# Patient Record
Sex: FEMALE | Race: OTHER | NOT HISPANIC OR LATINO | URBAN - METROPOLITAN AREA
[De-identification: names, ages, dates, MRNs, and addresses within clinical notes are randomized per-mention and may not be internally consistent; named-entity substitution may affect disease eponyms.]

---
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
## 2020-07-24 NOTE — DISCHARGE NOTE OB - MEDICATION SUMMARY - MEDICATIONS TO STOP TAKING
Patient was lifting heavy box on Tuesday and turning at same time to dump it into garbage when felt a sudden pop in right medial knee.  Swelling noted.  Difficult to straighten right knee.  CMS good.  Small bruised noted to medial side of knee.  No calf pain.  No other complaints.  Difficult to ambulate    I will STOP taking the medications listed below when I get home from the hospital:  None

## 2021-12-20 NOTE — DISCHARGE NOTE OB - CARE PROVIDER_API CALL
wife/Benefits, risks, and possible complications of procedure explained to patient/caregiver who verbalized understanding and gave written consent. [facility],   [address] [place], [place]  Phone: [phone]  Fax: (   )    -

## 2022-01-12 NOTE — DISCHARGE NOTE OB - ADDITIONAL INSTRUCTIONS
Continue breastfeeding.  Motrin as needed for pain.  Nothing per vagina x 6 weeks - no sex, tampons, douching, tub baths, etc.  Follow up in office in 5-6 weeks for check up
2
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
## 2024-01-22 ENCOUNTER — EMERGENCY (EMERGENCY)
Facility: HOSPITAL | Age: 24
LOS: 1 days | Discharge: HOME | End: 2024-01-22
Attending: EMERGENCY MEDICINE
Payer: COMMERCIAL

## 2024-01-22 VITALS
OXYGEN SATURATION: 99 % | RESPIRATION RATE: 18 BRPM | TEMPERATURE: 98 F | SYSTOLIC BLOOD PRESSURE: 110 MMHG | DIASTOLIC BLOOD PRESSURE: 68 MMHG | HEART RATE: 69 BPM

## 2024-01-22 VITALS
HEIGHT: 62.2 IN | DIASTOLIC BLOOD PRESSURE: 63 MMHG | OXYGEN SATURATION: 99 % | HEART RATE: 71 BPM | WEIGHT: 160.94 LBS | TEMPERATURE: 98 F | SYSTOLIC BLOOD PRESSURE: 122 MMHG | RESPIRATION RATE: 16 BRPM

## 2024-01-22 DIAGNOSIS — O07.4 FAILED ATTEMPTED TERMINATION OF PREGNANCY WITHOUT COMPLICATION: ICD-10-CM

## 2024-01-22 LAB
ALBUMIN SERPL ELPH-MCNC: 3.7 G/DL — SIGNIFICANT CHANGE UP (ref 3.5–5)
ALP SERPL-CCNC: 66 U/L — SIGNIFICANT CHANGE UP (ref 40–120)
ALT FLD-CCNC: 59 U/L DA — SIGNIFICANT CHANGE UP (ref 10–60)
ANION GAP SERPL CALC-SCNC: 5 MMOL/L — SIGNIFICANT CHANGE UP (ref 5–17)
APPEARANCE UR: ABNORMAL
AST SERPL-CCNC: 29 U/L — SIGNIFICANT CHANGE UP (ref 10–40)
BACTERIA # UR AUTO: ABNORMAL /HPF
BASOPHILS # BLD AUTO: 0.03 K/UL — SIGNIFICANT CHANGE UP (ref 0–0.2)
BASOPHILS NFR BLD AUTO: 0.3 % — SIGNIFICANT CHANGE UP (ref 0–2)
BILIRUB SERPL-MCNC: 0.8 MG/DL — SIGNIFICANT CHANGE UP (ref 0.2–1.2)
BILIRUB UR-MCNC: ABNORMAL
BUN SERPL-MCNC: 4 MG/DL — LOW (ref 7–18)
CALCIUM SERPL-MCNC: 10 MG/DL — SIGNIFICANT CHANGE UP (ref 8.4–10.5)
CHLORIDE SERPL-SCNC: 103 MMOL/L — SIGNIFICANT CHANGE UP (ref 96–108)
CO2 SERPL-SCNC: 26 MMOL/L — SIGNIFICANT CHANGE UP (ref 22–31)
COLOR SPEC: SIGNIFICANT CHANGE UP
COMMENT - URINE: SIGNIFICANT CHANGE UP
CREAT SERPL-MCNC: 0.55 MG/DL — SIGNIFICANT CHANGE UP (ref 0.5–1.3)
DIFF PNL FLD: ABNORMAL
EGFR: 130 ML/MIN/1.73M2 — SIGNIFICANT CHANGE UP
EOSINOPHIL # BLD AUTO: 0.08 K/UL — SIGNIFICANT CHANGE UP (ref 0–0.5)
EOSINOPHIL NFR BLD AUTO: 0.7 % — SIGNIFICANT CHANGE UP (ref 0–6)
EPI CELLS # UR: PRESENT
GLUCOSE SERPL-MCNC: 94 MG/DL — SIGNIFICANT CHANGE UP (ref 70–99)
GLUCOSE UR QL: NEGATIVE MG/DL — SIGNIFICANT CHANGE UP
HCG SERPL-ACNC: HIGH MIU/ML
HCT VFR BLD CALC: 41.1 % — SIGNIFICANT CHANGE UP (ref 34.5–45)
HGB BLD-MCNC: 13.7 G/DL — SIGNIFICANT CHANGE UP (ref 11.5–15.5)
IMM GRANULOCYTES NFR BLD AUTO: 0.3 % — SIGNIFICANT CHANGE UP (ref 0–0.9)
KETONES UR-MCNC: >=160 MG/DL
LEUKOCYTE ESTERASE UR-ACNC: ABNORMAL
LYMPHOCYTES # BLD AUTO: 1.95 K/UL — SIGNIFICANT CHANGE UP (ref 1–3.3)
LYMPHOCYTES # BLD AUTO: 17.8 % — SIGNIFICANT CHANGE UP (ref 13–44)
MCHC RBC-ENTMCNC: 31.3 PG — SIGNIFICANT CHANGE UP (ref 27–34)
MCHC RBC-ENTMCNC: 33.3 GM/DL — SIGNIFICANT CHANGE UP (ref 32–36)
MCV RBC AUTO: 93.8 FL — SIGNIFICANT CHANGE UP (ref 80–100)
MONOCYTES # BLD AUTO: 1.06 K/UL — HIGH (ref 0–0.9)
MONOCYTES NFR BLD AUTO: 9.7 % — SIGNIFICANT CHANGE UP (ref 2–14)
NEUTROPHILS # BLD AUTO: 7.8 K/UL — HIGH (ref 1.8–7.4)
NEUTROPHILS NFR BLD AUTO: 71.2 % — SIGNIFICANT CHANGE UP (ref 43–77)
NITRITE UR-MCNC: NEGATIVE — SIGNIFICANT CHANGE UP
NRBC # BLD: 0 /100 WBCS — SIGNIFICANT CHANGE UP (ref 0–0)
PH UR: 6.5 — SIGNIFICANT CHANGE UP (ref 5–8)
PLATELET # BLD AUTO: 265 K/UL — SIGNIFICANT CHANGE UP (ref 150–400)
POTASSIUM SERPL-MCNC: 3.5 MMOL/L — SIGNIFICANT CHANGE UP (ref 3.5–5.3)
POTASSIUM SERPL-SCNC: 3.5 MMOL/L — SIGNIFICANT CHANGE UP (ref 3.5–5.3)
PROT SERPL-MCNC: 7.4 G/DL — SIGNIFICANT CHANGE UP (ref 6–8.3)
PROT UR-MCNC: ABNORMAL MG/DL
RBC # BLD: 4.38 M/UL — SIGNIFICANT CHANGE UP (ref 3.8–5.2)
RBC # FLD: 12.9 % — SIGNIFICANT CHANGE UP (ref 10.3–14.5)
RBC CASTS # UR COMP ASSIST: 2 /HPF — SIGNIFICANT CHANGE UP (ref 0–4)
SODIUM SERPL-SCNC: 134 MMOL/L — LOW (ref 135–145)
SP GR SPEC: 1.02 — SIGNIFICANT CHANGE UP (ref 1–1.03)
UROBILINOGEN FLD QL: 1 MG/DL — SIGNIFICANT CHANGE UP (ref 0.2–1)
WBC # BLD: 10.95 K/UL — HIGH (ref 3.8–10.5)
WBC # FLD AUTO: 10.95 K/UL — HIGH (ref 3.8–10.5)
WBC UR QL: 2 /HPF — SIGNIFICANT CHANGE UP (ref 0–5)

## 2024-01-22 PROCEDURE — 99285 EMERGENCY DEPT VISIT HI MDM: CPT

## 2024-01-22 PROCEDURE — 99284 EMERGENCY DEPT VISIT MOD MDM: CPT | Mod: 25

## 2024-01-22 PROCEDURE — 80053 COMPREHEN METABOLIC PANEL: CPT

## 2024-01-22 PROCEDURE — 76801 OB US < 14 WKS SINGLE FETUS: CPT | Mod: 26

## 2024-01-22 PROCEDURE — 96361 HYDRATE IV INFUSION ADD-ON: CPT

## 2024-01-22 PROCEDURE — 76801 OB US < 14 WKS SINGLE FETUS: CPT

## 2024-01-22 PROCEDURE — 96375 TX/PRO/DX INJ NEW DRUG ADDON: CPT

## 2024-01-22 PROCEDURE — 76817 TRANSVAGINAL US OBSTETRIC: CPT | Mod: 26

## 2024-01-22 PROCEDURE — 85025 COMPLETE CBC W/AUTO DIFF WBC: CPT

## 2024-01-22 PROCEDURE — 81001 URINALYSIS AUTO W/SCOPE: CPT

## 2024-01-22 PROCEDURE — 84702 CHORIONIC GONADOTROPIN TEST: CPT

## 2024-01-22 PROCEDURE — 76817 TRANSVAGINAL US OBSTETRIC: CPT

## 2024-01-22 PROCEDURE — 36415 COLL VENOUS BLD VENIPUNCTURE: CPT

## 2024-01-22 PROCEDURE — 96374 THER/PROPH/DIAG INJ IV PUSH: CPT

## 2024-01-22 RX ORDER — ONDANSETRON 8 MG/1
1 TABLET, FILM COATED ORAL
Qty: 9 | Refills: 0
Start: 2024-01-22 | End: 2024-01-24

## 2024-01-22 RX ORDER — SODIUM CHLORIDE 9 MG/ML
1000 INJECTION INTRAMUSCULAR; INTRAVENOUS; SUBCUTANEOUS ONCE
Refills: 0 | Status: COMPLETED | OUTPATIENT
Start: 2024-01-22 | End: 2024-01-22

## 2024-01-22 RX ORDER — ONDANSETRON 8 MG/1
4 TABLET, FILM COATED ORAL ONCE
Refills: 0 | Status: COMPLETED | OUTPATIENT
Start: 2024-01-22 | End: 2024-01-22

## 2024-01-22 RX ORDER — ACETAMINOPHEN 500 MG
2 TABLET ORAL
Qty: 24 | Refills: 0
Start: 2024-01-22 | End: 2024-01-24

## 2024-01-22 RX ORDER — ACETAMINOPHEN 500 MG
1000 TABLET ORAL ONCE
Refills: 0 | Status: COMPLETED | OUTPATIENT
Start: 2024-01-22 | End: 2024-01-22

## 2024-01-22 RX ADMIN — SODIUM CHLORIDE 1000 MILLILITER(S): 9 INJECTION INTRAMUSCULAR; INTRAVENOUS; SUBCUTANEOUS at 17:11

## 2024-01-22 RX ADMIN — SODIUM CHLORIDE 1000 MILLILITER(S): 9 INJECTION INTRAMUSCULAR; INTRAVENOUS; SUBCUTANEOUS at 15:59

## 2024-01-22 RX ADMIN — Medication 400 MILLIGRAM(S): at 14:59

## 2024-01-22 RX ADMIN — SODIUM CHLORIDE 1000 MILLILITER(S): 9 INJECTION INTRAMUSCULAR; INTRAVENOUS; SUBCUTANEOUS at 16:11

## 2024-01-22 RX ADMIN — Medication 1000 MILLIGRAM(S): at 15:14

## 2024-01-22 RX ADMIN — Medication 1000 MILLIGRAM(S): at 15:29

## 2024-01-22 RX ADMIN — ONDANSETRON 4 MILLIGRAM(S): 8 TABLET, FILM COATED ORAL at 14:59

## 2024-01-22 RX ADMIN — SODIUM CHLORIDE 1000 MILLILITER(S): 9 INJECTION INTRAMUSCULAR; INTRAVENOUS; SUBCUTANEOUS at 14:59

## 2024-01-22 NOTE — ED ADULT NURSE NOTE - NSFALLUNIVINTERV_ED_ALL_ED
Bed/Stretcher in lowest position, wheels locked, appropriate side rails in place/Call bell, personal items and telephone in reach/Instruct patient to call for assistance before getting out of bed/chair/stretcher/Non-slip footwear applied when patient is off stretcher/Ellis to call system/Physically safe environment - no spills, clutter or unnecessary equipment/Purposeful proactive rounding/Room/bathroom lighting operational, light cord in reach

## 2024-01-22 NOTE — ED PROVIDER NOTE - CLINICAL SUMMARY MEDICAL DECISION MAKING FREE TEXT BOX
24-year-old female, presents for evaluation of abdominal pain, nausea/vomiting and vaginal bleeding after taking  pills on  and .  Appears uncomfortable, no signs of distress.  Vital signs within normal limits, afebrile.  Plan for ultrasound to assess for retained POC, labs, urine, meds and reassess.  At this time low suspicion for endometritis. 24-year-old female, presents for evaluation of abdominal pain, nausea/vomiting and vaginal bleeding after taking  pills on  and .  Appears uncomfortable, no signs of distress.  Vital signs within normal limits, afebrile.  Plan for ultrasound to assess for retained POC, labs, urine, meds and reassess.  At this time low suspicion for endometritis.    18: 33–patient well-appearing, feeling much better, abdomen soft, nondistended and nontender.  Discussed with OB/GYN.  At this time diagnosis is failed attempted .  Therefore unable to assist with  with additional medication in the ER.  [facility] .  Discussed red flags to come back to the hospital.  Will provide all results and information for outpatient follow-up.

## 2024-01-22 NOTE — ED PROVIDER NOTE - OBJECTIVE STATEMENT
24-year-old female, history of ectopic pregnancy, presents with evaluation of generalized abdominal pain most severe to LLQ, nausea/vomiting and vaginal bleeding after medical .  Was given medication by mouth on  and for additional pills to put on the cheek the next day.  Patient feels like she vomited the second dose that she took at home.  Since then reports generalized cramping pain with vaginal bleeding.  Feels like it is menstrual bleeding and changes 3–4 pads per day.  On the first day had temperature max 100.6 F but in the last 2 days denies any fever at all.  Has difficulty tolerating p.o. intake. LMP 23.

## 2024-01-22 NOTE — ED PROVIDER NOTE - PATIENT PORTAL LINK FT
You can access the FollowMyHealth Patient Portal offered by [facility] by registering at the following website: [email or web address]. By joining [employer]’s FollowMyHealth portal, you will also be able to view your health information using other applications (apps) compatible with our system.

## 2024-01-22 NOTE — ED PROVIDER NOTE - ATTENDING APP SHARED VISIT CONTRIBUTION OF CARE
I agree with the NP findings except as noted in my attestation note.    24 female with hx of ectopic pregnancy s/p medical  .   Pt presenting to the ED reporting lower abdominal pain & vaginal spotting.    Gastrointestinal: (+) abdominal pain (-) diarrhea (-) vomiting  Genitourinary: See HPI    Gen:  Awake, alert, NAD, WDWN, NCAT, non-toxic appearing.   Eyes:  PERRL, EOMI, no icterus, normal lids/lashes, normal conjunctivae.  ENT:  External inspection normal, pink/moist membranes.    CV:  S1S2, regular rate and rhythm, no murmur/gallops/rubs, no JVD, 2+ pulses b/l, no edema/cords/homans, warm/well-perfused.  Resp:  Normal respiratory rate/effort, no respiratory distress, normal voice, speaking full sentences, lungs clear to auscultation b/l, no wheezing/rales/rhonchi, no retractions, no stridor.  Abd:  Soft abdomen, no tender/distended/guarding/rebound, no pulsatile mass, no CVA tender.  Musculoskeletal:  N/V intact, FROM all 4 extremities, normal motor tone, stable gait.   Neck:  FROM neck, supple, trachea midline, no meningismus.   Skin:  Color normal for race, warm and dry, no rash.  Neuro:  Oriented x3, CN 2-12 intact (grossly), normal motor (grossly), normal sensory (grossly),  normal gait.  GCS 15  Psych:  Attention normal. Affect normal. Behavior normal. Judgment normal.     24 female with hx of ectopic pregnancy s/p medical  .   Pt presenting to the ED reporting lower abdominal pain & vaginal spotting.  Vitals stable.  Nontoxic appearing, n/v intact.  Airway intact, no respiratory distress, no hypoxia.  No abdominal or CVA tenderness.    Prior type A+ in EMR from 2018    Plan to obtain:    -Labs, ultrasound R/O retained POC, analgesia as needed, GYN consult, observe/reassess    Lab values demonstrate no acute/emergent pathology.  hCG ~40 K  Ultrasound showing no definitive IUP  Care discussed with GYN ADALGISA Galicia, no indication for operative intervention at this time.  No indication for admission at this time.  Recommending close outpatient follow-up with GYN    Pt advised regarding need for close outpatient follow up.  Patient stable for further care in outpatient setting. No indication for inpatient admission at this time. Patient advised regarding symptomatic & supportive care and symptoms to prompt ED return. Strict return precautions provided.

## 2024-01-22 NOTE — ED PROVIDER NOTE - RESPIRATORY, MLM
Breath sounds clear and equal bilaterally.
Goal: Patient will be able to complete all bed mobility independently in 3 weeks.

## 2024-01-22 NOTE — ED ADULT NURSE NOTE - OBJECTIVE STATEMENT
Patient presented to the ED complaining of foul smelling vaginal bleeding and pain to the left side of the abdomen after taking  pills last wednesday.

## 2024-01-22 NOTE — ED PROVIDER NOTE - NSFOLLOWUPINSTRUCTIONS_ED_ALL_ED_FT
Follow up with the [facility] clinic within 2-3 days.  If you experience any new or worsening symptoms or if you are concerned you can always come back to the emergency for a re-evaluation.  Some results may not be available at the time of your discharge from the hospital. You can download the FOLLOW MY HEALTH susana and have access to these results.  If there were any prescriptions given to you during the visit today take them as prescribed. If you have any questions you can ask the pharmacist.

## 2024-01-22 NOTE — CONSULT NOTE ADULT - SUBJECTIVE AND OBJECTIVE BOX
24-yo female  , history of ectopic pregnancy, presents with evaluation of generalized abdominal pain most severe to LLQ, nausea/vomiting and vaginal bleeding after medical .  Was given medication by mouth on  and for additional pills to put on the cheek the next day.  Patient feels like she vomited the second dose that she took at home.  Since then reports generalized cramping pain with vaginal bleeding.  Feels like it is menstrual bleeding and changes 3–4 pads per day.  On the first day had temperature max 100.6 F but in the last 2 days denies any fever at all.  Has difficulty tolerating p.o. intake. LMP 23.    PMH: cholelithiasis, ovarian cysts  PSH: cholecystectomy , ectopic with salpingectomy (unknown side) D&C  Pobhx:   uncomplicated, ETOP with D&C, SABx1; ectopic  with salpingectomy  Pgyn: h/o chlamydia at 21yo treated; right ovarian cyst; denies fibroids or abnormal pa smears  Meds: motrin as needed  Allergies: NKDA  Social: denies smoking, etoh or drug use; +h/o anxiety/depression seeing therapist    PE: Pt seated comfortably in no distress  Abd: soft, NT; no guarding or rebound  Ext: soft, NT; no edema  Back: no CVAT  Gyn: deferred by patient    Labs:                         13.7   10.95 )-----------( 265      ( 2024 14:40 )             41.1         134<L>  |  103  |  4<L>  ----------------------------<  94  3.5   |  26  |  0.55    Ca    10.0      2024 14:40    TPro  7.4  /  Alb  3.7  /  TBili  0.8  /  DBili  x   /  AST  29  /  ALT  59  /  AlkPhos  66      HCG Quantitative, Serum: 02795    US Transvaginal, OB (24 @ 15:40) >  EXAM:  US OB TRANSVAGINAL   ORDERED BY:  SHANNON MCGOVERN   ACC: [id number] EXAM:  US OB LES THAN 14 WKS 1ST GEST   ORDERED BY:  SHANNON MCGOVERN   PROCEDURE DATE:  2024    INTERPRETATION:  CLINICAL INFORMATION: Vaginal bleeding. Status post   medical  2024. Evaluate for retained products of conception.  LMP: 2023  Estimated Gestational Age by LMP: 7 weeks 2 days  COMPARISON: None available.  Transabdominal pelvic sonogram only.  FINDINGS:  Uterus: 9.9 x 5.1 x 5.9 cm. Intrauterine gestational sac is identified.   Low level echoes within the gestational sac. No fetal pole or yolk sac is   seen.  Gestational Sac Size (mean): 16.2 mm  Gestational Sac Shape : Abnormal.  Right ovary: 2.3cm x 1.3 cm x 1.6 cm. Within normal limits. Normal   arterial and venous waveforms.  Left ovary: 6.0 cm x 4.0 cm x 4.7 cm. Simple cyst measuring 4.6 x 2.8 x   3.4 cm. Corpus luteum. Normal arterial and venous waveforms.  Fluid: None.  IMPRESSION:  Intrauterine pregnancy of uncertain viability. No yolk sac or fetal pole   seen. Serial hCG and ultrasound are recommended to determine the   significance of these findings.    d/w Dr. Olguin

## 2024-01-22 NOTE — CONSULT NOTE ADULT - PROBLEM SELECTOR RECOMMENDATION 9
Pt instructed to return to [facility] tomorrow for follow up and to discuss further options for desired termination of pregnancy.  Encouraged oral hydration, light meals as tolerated, tylenol/motrin as needed for pain or fever,   Return to ED if worsening symptoms including pain, fever, chills, vomiting, worsening vaginal bleeding or any other acute issues

## 2024-11-02 ENCOUNTER — EMERGENCY (EMERGENCY)
Facility: HOSPITAL | Age: 24
LOS: 1 days | Discharge: HOME | End: 2024-11-02
Admitting: EMERGENCY MEDICINE

## 2024-11-02 VITALS
SYSTOLIC BLOOD PRESSURE: 126 MMHG | RESPIRATION RATE: 16 BRPM | HEART RATE: 99 BPM | TEMPERATURE: 98 F | HEIGHT: 60 IN | DIASTOLIC BLOOD PRESSURE: 80 MMHG | OXYGEN SATURATION: 100 % | WEIGHT: 166.01 LBS

## 2024-11-02 PROCEDURE — 99283 EMERGENCY DEPT VISIT LOW MDM: CPT

## 2024-11-02 RX ORDER — ACETAMINOPHEN 500 MG
650 TABLET ORAL ONCE
Refills: 0 | Status: COMPLETED | OUTPATIENT
Start: 2024-11-02 | End: 2024-11-02

## 2024-11-02 RX ADMIN — Medication 650 MILLIGRAM(S): at 22:20
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
## 2024-11-02 NOTE — ED PROVIDER NOTE - NSFOLLOWUPINSTRUCTIONS_ED_ALL_ED_FT
Follow up with your PMD within 24-48 hrs   Rest, Take Tylenol 650mg every 4-6 hours as needed for pain.  Return to the emergency department with ANY worsening or new concerning symptoms.     Head Injury, Adult:    There are many types of head injuries. Head injuries can be as minor as a bump, or they can be more severe. More severe head injuries include:  A jarring injury to the brain (concussion).  A bruise of the brain (contusion). This means there is bleeding in the brain that can cause swelling.  A cracked skull (skull fracture).  Bleeding in the brain that collects, clots, and forms a bump (hematoma).  After a head injury, you may need to be observed for a while in the emergency department or urgent care. Sometimes admission to the hospital is needed.    After a head injury has happened, most problems occur within the first 24 hours, but side effects may occur up to 7–10 days after the injury. It is important to watch your condition for any changes.    What are the causes?  There are many possible causes of a head injury. A serious head injury may happen to someone who is in a car accident (motor vehicle collision). Other causes of major head injuries include bicycle or motorcycle accidents, sports injuries, and falls.    Risk factors  This condition is more likely to occur in people who:  Drink a lot of alcohol or use drugs.  Are over the age of 65.  Are at risk for falls.  What are the symptoms?  There are many possible symptoms of a head injury. Visible symptoms of a head injury include a bruise, bump, or bleeding at the site of the injury. Other non-visible symptoms include:  Feeling sleepy or not being able to stay awake.  Passing out.  Headache.  Seizures.  Dizziness.  Confusion.  Memory problems.  Nausea or vomiting.  Other possible symptoms that may develop after the head injury include:  Poor attention and concentration.  Fatigue or tiring easily.  Irritability.  Being uncomfortable around bright lights or loud noises.  Anxiety or depression.  Disturbed sleep.    How is this treated?  Treatment for this condition depends on the severity and type of injury you have. The main goal of treatment is to prevent complications and allow the brain time to heal.    For mild head injury, you may be sent home and treatment may include:  Observation. A responsible adult should stay with you for 24 hours after your injury and check on you often.  Physical rest.  Brain rest.  Pain medicines.    Follow these instructions at home:    Rest as much as possible and avoid activities that are physically hard or tiring.  Make sure you get enough sleep.  Limit activities that require a lot of thought or attention, such as:  Watching TV.  Playing memory games and puzzles.  Job-related work or homework.  Working on the computer, social media, and texting.  Avoid activities that could cause another head injury, such as playing sports, until your health care provider approves. Having another head injury, especially before the first one has healed, can be dangerous.  Ask your health care provider when it is safe for you to return to your regular activities, including work or school. Ask your health care provider for a step-by-step plan for gradually returning to activities.  Ask your health care provider when you can drive, ride a bicycle, or use heavy machinery. Your ability to react may be slower after a brain injury. Never do these activities if you are dizzy.  Lifestyle     Do not drink alcohol until your health care provider approves, and avoid drug use. Alcohol and certain drugs may slow your recovery and can put you at risk of further injury.  If it is harder than usual to remember things, write them down.  If you are easily distracted, try to do one thing at a time.  Talk with family members or close friends when making important decisions.  Tell your friends, family, a trusted colleague, and  about your injury, symptoms, and restrictions. Have them watch for any new or worsening problems.  General instructions     Take over-the-counter and prescription medicines only as told by your health care provider.  Have someone stay with you for 24 hours after your head injury. This person should watch you for any changes in your symptoms and be ready to seek medical help, as needed.  Keep all follow-up visits as told by your health care provider. This is important.  How is this prevented?  Work on improving your balance and strength to avoid falls.  Wear a seatbelt when you are in a moving vehicle.  Wear a helmet when riding a bicycle, skiing, or doing any other sport or activity that has a risk of injury.  Drink alcohol only in moderation.  Take safety measures in your home, such as:  Removing clutter and tripping hazards from floors and stairways.  Using grab bars in bathrooms and handrails by stairs.  Placing non-slip mats on floors and in bathtubs.  Improving lighting in dim areas.  Get help right away if:  You have:  A severe headache that is not helped by medicine.  Trouble walking, have weakness in your arms and legs, or lose your balance.  Clear or bloody fluid coming from your nose or ears.  Changes in your vision.  A seizure.  You vomit.  Your symptoms get worse.  Your speech is slurred.  You pass out.  You are sleepier and have trouble staying awake.  Your pupils change size.  These symptoms may represent a serious problem that is an emergency. Do not wait to see if the symptoms will go away. Get medical help right away. Call your local emergency services (911 in the [place] Do not drive yourself to the hospital.     This information is not intended to replace advice given to you by your health care provider. Make sure you discuss any questions you have with your health care provider.

## 2024-11-02 NOTE — ED ADULT TRIAGE NOTE - RESPIRATORY RATE (BREATHS/MIN)
HPI: Josiane Temple is a 31 year old female being seen at [facility] today for an accelerated heart rate that she has had for over a year, with increased symptoms of anxiousness, Headache, for the past few days.  Heartrate up to 140, /88.  No history of thyroid disorder/presyncopal/syncopal episode.  No CP/SOB.   2, para 2, AB 0.  No pre-eclampsia.  PMH is negative.    Social Hx;Smoke - no                     ETOH - no  Family HX;No CAD or Cancer  ROS negative except as noted above.    Exam:  Visit Vitals  /74 (BP Location: [facility] [place] Patient Position: Sitting, Cuff Size: Regular)   Pulse 117   Temp 97.8 °F (36.6 °C) (Oral)   Ht 5' 6\" (1.676 m)   Wt 73.9 kg   LMP 2017 (Approximate)   SpO2 97%   BMI 26.31 kg/m²      General: cooperative, no acute distress  Eyes: anicteric  Mouth: Mucous membranes moist, oral cavity without lesions.  Neck: supple. No bruits. Thyroid small without nodules.  Cardiology: RRR. No murmur. Normal S1 S2.  EKG ST at 110.    Pulmonary: Clear.  Abdomen: nontender, nondistended, normoactive bowel sounds.  No hepatosplenomegaly.  No rebound tenderness.  Lymphadenopathy: No lymphadenopathy.  Extremities: no clubbing, cyanosis, or edema.  Gait normal.  Skin: no rashes, lesions.  Psychiatric:  Cooperative, Appropriate mood & affect.   Data:   Labs reviewed - pending.    Assessment / Plan:  Arrhythmia - No caffeine / ETOH.  Labs pending. Follow-up Cardiology after lab review if needed.  Headaches - Excedrin Migraine / Midrin PRN.    *!!Please call patient with Polish  when communicating lab results.      Encouraged healthy lifestyle with regular exercise and healthy diet.  Return to clinic to see cardiologist.    Second part of visit with Polish  [id number] from [email or web address].    Scribed by: Miriam Medina, .    
16
<-------- Click here to INCLUDE CoVID-19 Discharge Instructions
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
## 2024-11-02 NOTE — ED PROVIDER NOTE - PATIENT PORTAL LINK FT
You can access the FollowMyHealth Patient Portal offered by [facility] by registering at the following website: [email or web address]. By joining [employer]’s FollowMyHealth portal, you will also be able to view your health information using other applications (apps) compatible with our system.

## 2024-11-03 PROBLEM — O00.90 UNSPECIFIED ECTOPIC PREGNANCY WITHOUT INTRAUTERINE PREGNANCY: Chronic | Status: ACTIVE | Noted: 2024-01-22
